# Patient Record
Sex: FEMALE | Race: WHITE | NOT HISPANIC OR LATINO | Employment: FULL TIME | URBAN - METROPOLITAN AREA
[De-identification: names, ages, dates, MRNs, and addresses within clinical notes are randomized per-mention and may not be internally consistent; named-entity substitution may affect disease eponyms.]

---

## 2023-11-10 PROBLEM — R94.131 ABNORMAL ELECTROMYOGRAM (EMG): Status: ACTIVE | Noted: 2023-11-10

## 2023-11-10 PROBLEM — F32.A ANXIETY AND DEPRESSION: Status: ACTIVE | Noted: 2023-11-10

## 2023-11-10 PROBLEM — K25.9 GASTRIC ULCER: Status: ACTIVE | Noted: 2023-11-10

## 2023-11-10 PROBLEM — D36.13 NEUROMA OF LOWER EXTREMITY: Status: ACTIVE | Noted: 2023-11-10

## 2023-11-10 PROBLEM — J45.909 ASTHMA (HHS-HCC): Status: ACTIVE | Noted: 2023-11-10

## 2023-11-10 PROBLEM — K52.832 LYMPHOCYTIC-PLASMACYTIC COLITIS: Status: ACTIVE | Noted: 2023-11-10

## 2023-11-10 PROBLEM — E77.8 HYPOPROTEINEMIA (MULTI): Status: ACTIVE | Noted: 2023-11-10

## 2023-11-10 PROBLEM — R79.89 ELEVATED TSH: Status: ACTIVE | Noted: 2023-11-10

## 2023-11-10 PROBLEM — F41.9 ANXIETY AND DEPRESSION: Status: ACTIVE | Noted: 2023-11-10

## 2023-11-10 PROBLEM — H53.8 BLURRED VISION: Status: ACTIVE | Noted: 2023-11-10

## 2023-11-10 PROBLEM — M54.12 CERVICAL RADICULITIS: Status: ACTIVE | Noted: 2023-11-10

## 2023-11-10 PROBLEM — K63.5 COLON POLYPS: Status: ACTIVE | Noted: 2023-11-10

## 2023-11-10 PROBLEM — G47.9 DISTURBANCE OF SLEEP: Status: ACTIVE | Noted: 2023-11-10

## 2023-11-10 PROBLEM — R20.2 PARESTHESIA: Status: ACTIVE | Noted: 2023-11-10

## 2023-11-10 PROBLEM — K90.0 ADULT CELIAC DISEASE (HHS-HCC): Status: ACTIVE | Noted: 2023-11-10

## 2023-11-10 PROBLEM — E78.5 HLD (HYPERLIPIDEMIA): Status: ACTIVE | Noted: 2023-11-10

## 2023-11-10 PROBLEM — E66.9 OBESITY (BMI 30-39.9): Status: ACTIVE | Noted: 2023-11-10

## 2023-11-10 RX ORDER — ALBUTEROL SULFATE 90 UG/1
2 AEROSOL, METERED RESPIRATORY (INHALATION) EVERY 4 HOURS PRN
COMMUNITY

## 2023-11-10 RX ORDER — ONDANSETRON 4 MG/1
4 TABLET, FILM COATED ORAL 3 TIMES DAILY PRN
COMMUNITY

## 2023-11-10 RX ORDER — BUDESONIDE 3 MG/1
9 CAPSULE, COATED PELLETS ORAL DAILY
COMMUNITY

## 2023-11-10 RX ORDER — BUPROPION HYDROCHLORIDE 300 MG/1
300 TABLET ORAL DAILY
COMMUNITY
End: 2023-11-13 | Stop reason: ALTCHOICE

## 2023-11-10 RX ORDER — ESCITALOPRAM OXALATE 10 MG/1
10 TABLET ORAL DAILY
COMMUNITY
Start: 2023-07-07 | End: 2023-11-13 | Stop reason: ALTCHOICE

## 2023-11-10 RX ORDER — LORATADINE 10 MG/1
10 TABLET ORAL DAILY
COMMUNITY
Start: 2023-03-06

## 2023-11-10 RX ORDER — LORATADINE AND PSEUDOEPHEDRINE SULFATE 5; 120 MG/1; MG/1
1 TABLET, EXTENDED RELEASE ORAL EVERY 12 HOURS PRN
COMMUNITY
Start: 2018-03-20

## 2023-11-10 RX ORDER — ROSUVASTATIN CALCIUM 10 MG/1
10 TABLET, COATED ORAL NIGHTLY
COMMUNITY
Start: 2023-09-15 | End: 2023-11-13 | Stop reason: SDUPTHER

## 2023-11-10 RX ORDER — MESALAMINE 0.38 G/1
0.75 CAPSULE, EXTENDED RELEASE ORAL EVERY MORNING
COMMUNITY
Start: 2023-10-23

## 2023-11-10 RX ORDER — KETOCONAZOLE 20 MG/G
1 CREAM TOPICAL 2 TIMES DAILY
COMMUNITY
Start: 2023-10-26

## 2023-11-13 ENCOUNTER — OFFICE VISIT (OUTPATIENT)
Dept: PRIMARY CARE | Facility: CLINIC | Age: 48
End: 2023-11-13
Payer: COMMERCIAL

## 2023-11-13 VITALS
DIASTOLIC BLOOD PRESSURE: 70 MMHG | HEART RATE: 67 BPM | WEIGHT: 194 LBS | HEIGHT: 67 IN | SYSTOLIC BLOOD PRESSURE: 110 MMHG | BODY MASS INDEX: 30.45 KG/M2

## 2023-11-13 DIAGNOSIS — F32.A ANXIETY AND DEPRESSION: ICD-10-CM

## 2023-11-13 DIAGNOSIS — E78.5 HYPERLIPIDEMIA, UNSPECIFIED HYPERLIPIDEMIA TYPE: Primary | ICD-10-CM

## 2023-11-13 DIAGNOSIS — F41.9 ANXIETY AND DEPRESSION: ICD-10-CM

## 2023-11-13 PROCEDURE — 1036F TOBACCO NON-USER: CPT | Performed by: FAMILY MEDICINE

## 2023-11-13 PROCEDURE — 99213 OFFICE O/P EST LOW 20 MIN: CPT | Performed by: FAMILY MEDICINE

## 2023-11-13 RX ORDER — BUPROPION HYDROCHLORIDE 300 MG/1
300 TABLET ORAL DAILY
Status: CANCELLED | OUTPATIENT
Start: 2023-11-13

## 2023-11-13 RX ORDER — ROSUVASTATIN CALCIUM 10 MG/1
10 TABLET, COATED ORAL NIGHTLY
Qty: 90 TABLET | Refills: 2 | Status: SHIPPED | OUTPATIENT
Start: 2023-11-13

## 2023-11-13 RX ORDER — FLUOXETINE HYDROCHLORIDE 40 MG/1
40 CAPSULE ORAL DAILY
Qty: 30 CAPSULE | Refills: 1 | Status: SHIPPED | OUTPATIENT
Start: 2023-11-13 | End: 2023-11-13 | Stop reason: SDUPTHER

## 2023-11-13 RX ORDER — FLUOXETINE 20 MG/1
20 TABLET ORAL DAILY
Status: CANCELLED | OUTPATIENT
Start: 2023-11-13

## 2023-11-13 ASSESSMENT — PATIENT HEALTH QUESTIONNAIRE - PHQ9
1. LITTLE INTEREST OR PLEASURE IN DOING THINGS: NOT AT ALL
2. FEELING DOWN, DEPRESSED OR HOPELESS: NOT AT ALL
SUM OF ALL RESPONSES TO PHQ9 QUESTIONS 1 AND 2: 0

## 2023-11-13 ASSESSMENT — LIFESTYLE VARIABLES
AUDIT-C TOTAL SCORE: 4
SKIP TO QUESTIONS 9-10: 0
HOW OFTEN DO YOU HAVE A DRINK CONTAINING ALCOHOL: 2-3 TIMES A WEEK
HOW OFTEN DO YOU HAVE SIX OR MORE DRINKS ON ONE OCCASION: LESS THAN MONTHLY
HOW MANY STANDARD DRINKS CONTAINING ALCOHOL DO YOU HAVE ON A TYPICAL DAY: 1 OR 2

## 2023-11-13 NOTE — PROGRESS NOTES
"/70   Pulse 67   Ht 1.702 m (5' 7\")   Wt 88 kg (194 lb)   BMI 30.38 kg/m²     Past Medical History:   Diagnosis Date    Celiac disease 03/20/2018    Adult celiac disease    Obesity, unspecified 12/20/2021    Obesity (BMI 30-39.9)    Other chest pain 12/20/2021    Chest discomfort       Patient Active Problem List   Diagnosis    Adult celiac disease    Anxiety and depression    Asthma    Blurred vision    Colon polyps    Elevated TSH    Gastric ulcer    HLD (hyperlipidemia)    Hypoproteinemia (CMS/HCC)    Obesity (BMI 30-39.9)    Lymphocytic-plasmacytic colitis    Abnormal electromyogram (EMG)    Cervical radiculitis    Disturbance of sleep    Neuroma of lower extremity    Paresthesia       Current Outpatient Medications   Medication Sig Dispense Refill    albuterol 90 mcg/actuation inhaler Inhale 2 puffs every 4 hours if needed for wheezing or shortness of breath.      budesonide EC (Entocort EC) 3 mg 24 hr capsule Take 3 capsules (9 mg) by mouth once daily.      FLUoxetine (PROzac) 20 mg tablet Take 1 tablet (20 mg) by mouth once daily.      ketoconazole (NIZOral) 2 % cream Apply 1 Application topically 2 times a day.      loratadine (Claritin) 10 mg tablet Take 1 tablet (10 mg) by mouth once daily.      loratadine-pseudoephedrine (Claritin-D 12 Hour) 5-120 mg 12 hr tablet Take 1 tablet by mouth every 12 hours if needed.      mesalamine ER (Apriso) 0.375 gram 24 hr capsule Take 2 capsules (0.75 g) by mouth once daily in the morning.      ondansetron (Zofran) 4 mg tablet Take 1 tablet (4 mg) by mouth 3 times a day as needed.      rosuvastatin (Crestor) 10 mg tablet Take 1 tablet (10 mg) by mouth once daily at bedtime.      buPROPion XL (Wellbutrin XL) 300 mg 24 hr tablet Take 1 tablet (300 mg) by mouth once daily.      escitalopram (Lexapro) 10 mg tablet Take 1 tablet (10 mg) by mouth once daily.      FLUoxetine (PROzac) 10 mg tablet TAKE 1 TABLET BY MOUTH EVERY DAY (Patient not taking: Reported on " 11/13/2023) 90 tablet 1     No current facility-administered medications for this visit.       ASSESSMENT/PLAN    Patient is here for 1.  Anxiety depression see below not homicidal not suicidal.  Up to be present off the Lexapro.  Taken fluoxetine 20 mg once a day has worked well.  But has noticed heard work cigarettes has come back.  Is unsure how much of that is just stress and anxiety with above versus other.  But has done well with that 2.  Celiac disease on the med.  Is doing well with her bowel disease which is excellent she is doing much better.  3.  Hypercholesterolemia with above refill on medication.  Pros cons possible side effects increasing medication we did discuss.  We will go up on the fluoxetine to 40 mg once a day.  She is can schedule with new primary care doctor.  She has come back from [place] for a wedding up to [facility] had a wonderful time which is excellent.  Pleasant no apparent distress affect appropriate and bright well-kept younger looking than stated age vital signs stable  Neck supple lungs clear to station normal S1-S2 without murmurs or rub  Anxiety depression we will go up on the fluoxetine to 40 mg once a day.  Continue with that if not resolving let me know if needs further refills and or other let me know will be dependent upon what her insurance will let us prescribed.  And proceed from there follow-up new primary care doctor.  Sooner if needed 2.  Hypercholesterolemia with above get lab work next 4 to 6 months.    There are no diagnoses linked to this encounter.

## 2023-11-18 RX ORDER — FLUOXETINE HYDROCHLORIDE 40 MG/1
40 CAPSULE ORAL DAILY
Qty: 30 CAPSULE | Refills: 1 | Status: SHIPPED | OUTPATIENT
Start: 2023-11-18 | End: 2024-01-11

## 2024-01-11 PROBLEM — N80.03 ADENOMYOSIS: Status: ACTIVE | Noted: 2022-03-04

## 2024-05-15 ENCOUNTER — APPOINTMENT (OUTPATIENT)
Dept: PRIMARY CARE | Facility: CLINIC | Age: 49
End: 2024-05-15
Payer: COMMERCIAL

## 2024-06-12 ENCOUNTER — APPOINTMENT (OUTPATIENT)
Dept: PRIMARY CARE | Facility: CLINIC | Age: 49
End: 2024-06-12
Payer: COMMERCIAL

## 2024-06-12 VITALS
DIASTOLIC BLOOD PRESSURE: 72 MMHG | TEMPERATURE: 98 F | HEART RATE: 75 BPM | HEIGHT: 67 IN | BODY MASS INDEX: 31.23 KG/M2 | SYSTOLIC BLOOD PRESSURE: 111 MMHG | WEIGHT: 199 LBS

## 2024-06-12 DIAGNOSIS — F41.9 ANXIETY AND DEPRESSION: Primary | ICD-10-CM

## 2024-06-12 DIAGNOSIS — E78.5 HYPERLIPIDEMIA, UNSPECIFIED HYPERLIPIDEMIA TYPE: ICD-10-CM

## 2024-06-12 DIAGNOSIS — E55.9 VITAMIN D DEFICIENCY: ICD-10-CM

## 2024-06-12 DIAGNOSIS — F32.A ANXIETY AND DEPRESSION: Primary | ICD-10-CM

## 2024-06-12 DIAGNOSIS — E53.8 VITAMIN B12 DEFICIENCY: ICD-10-CM

## 2024-06-12 PROCEDURE — 1036F TOBACCO NON-USER: CPT | Performed by: FAMILY MEDICINE

## 2024-06-12 PROCEDURE — 99214 OFFICE O/P EST MOD 30 MIN: CPT | Performed by: FAMILY MEDICINE

## 2024-06-12 RX ORDER — FLUOXETINE HYDROCHLORIDE 20 MG/1
20 CAPSULE ORAL DAILY
Qty: 90 CAPSULE | Refills: 1 | Status: SHIPPED | OUTPATIENT
Start: 2024-06-12 | End: 2025-06-12

## 2024-06-12 RX ORDER — BUPROPION HYDROCHLORIDE 150 MG/1
150 TABLET ORAL EVERY MORNING
Qty: 90 TABLET | Refills: 1 | Status: SHIPPED | OUTPATIENT
Start: 2024-06-12 | End: 2025-06-12

## 2024-06-12 ASSESSMENT — PATIENT HEALTH QUESTIONNAIRE - PHQ9
1. LITTLE INTEREST OR PLEASURE IN DOING THINGS: NOT AT ALL
SUM OF ALL RESPONSES TO PHQ9 QUESTIONS 1 AND 2: 0
2. FEELING DOWN, DEPRESSED OR HOPELESS: NOT AT ALL

## 2024-06-12 NOTE — PROGRESS NOTES
"    /72   Pulse 75   Temp 36.7 °C (98 °F)   Ht 1.702 m (5' 7\")   Wt 90.3 kg (199 lb)   BMI 31.17 kg/m²     Past Medical History:   Diagnosis Date    Celiac disease ([facility]) 03/20/2018    Adult celiac disease    Obesity, unspecified 12/20/2021    Obesity (BMI 30-39.9)    Other chest pain 12/20/2021    Chest discomfort       Patient Active Problem List   Diagnosis    Adult celiac disease ([facility])    Anxiety and depression    Asthma ([facility])    Blurred vision    Colon polyps    Elevated TSH    Gastric ulcer    HLD (hyperlipidemia)    Hypoproteinemia (Multi)    Obesity (BMI 30-39.9)    Lymphocytic-plasmacytic colitis    Abnormal electromyogram (EMG)    Cervical radiculitis    Disturbance of sleep    Neuroma of lower extremity    Paresthesia    Adenomyosis    Diarrhea    DUB (dysfunctional uterine bleeding)    Fibrocystic breast changes    Non-intractable vomiting with nausea       Current Outpatient Medications   Medication Sig Dispense Refill    albuterol 90 mcg/actuation inhaler Inhale 2 puffs every 4 hours if needed for wheezing or shortness of breath.      budesonide EC (Entocort EC) 3 mg 24 hr capsule Take 3 capsules (9 mg) by mouth once daily.      FLUoxetine (PROzac) 40 mg capsule TAKE 1 CAPSULE BY MOUTH EVERY DAY 90 capsule 0    ketoconazole (NIZOral) 2 % cream Apply 1 Application topically 2 times a day.      loratadine (Claritin) 10 mg tablet Take 1 tablet (10 mg) by mouth once daily.      loratadine-pseudoephedrine (Claritin-D 12 Hour) 5-120 mg 12 hr tablet Take 1 tablet by mouth every 12 hours if needed.      mesalamine ER (Apriso) 0.375 gram 24 hr capsule Take 2 capsules (0.75 g) by mouth once daily in the morning.      ondansetron (Zofran) 4 mg tablet Take 1 tablet (4 mg) by mouth 3 times a day as needed.      rosuvastatin (Crestor) 10 mg tablet Take 1 tablet (10 mg) by mouth once daily at bedtime. 90 tablet 2     No current facility-administered medications for this visit. "       CC/HPI/ASSESSMENT/PLAN    CC refill medicine    HPI patient with history of anxiety and depression currently using Prozac 40 mg daily.  She notes her last visit 6 months ago the Prozac was increased from 20 mg to 40 mg.  Patient notes no significant movement of her anxiety.  She would like to restart Wellbutrin to help her with her smoking.  She denies fever chills chest pain.  Patient with a history of hyperlipidemia currently using Crestor 10 mg for cholesterol.  She will need blood work.  Denies side effect medication.  Colonoscopy last year.  ROS negative except noted above past medical social surgical history reviewed.  Medication list reviewed    Exam calm neuro alert oriented CN II 12 intact psych calm pleasant no psychosis eyes no jaundice Ext no edema skin no rash    A/P 1.  Anxiety and depression mildly uncontrolled by history.  We will decrease Prozac 40 mg daily.  We will start Wellbutrin  mg daily.  Follow-up 6 months.  #2 hyperlipidemia continue Crestor 10 mg.  Blood work is ordered.  Follow-up 6 months    There are no diagnoses linked to this encounter.

## 2024-07-08 DIAGNOSIS — E78.5 HYPERLIPIDEMIA, UNSPECIFIED HYPERLIPIDEMIA TYPE: ICD-10-CM

## 2024-07-08 RX ORDER — ROSUVASTATIN CALCIUM 10 MG/1
10 TABLET, COATED ORAL NIGHTLY
Qty: 90 TABLET | Refills: 2 | Status: SHIPPED | OUTPATIENT
Start: 2024-07-08

## 2024-07-19 ENCOUNTER — HOSPITAL ENCOUNTER (OUTPATIENT)
Dept: RADIOLOGY | Facility: CLINIC | Age: 49
Discharge: HOME | End: 2024-07-19
Payer: COMMERCIAL

## 2024-07-19 ENCOUNTER — OFFICE VISIT (OUTPATIENT)
Dept: PRIMARY CARE | Facility: CLINIC | Age: 49
End: 2024-07-19
Payer: COMMERCIAL

## 2024-07-19 VITALS
SYSTOLIC BLOOD PRESSURE: 118 MMHG | WEIGHT: 204 LBS | OXYGEN SATURATION: 98 % | HEIGHT: 67 IN | BODY MASS INDEX: 32.02 KG/M2 | HEART RATE: 68 BPM | DIASTOLIC BLOOD PRESSURE: 80 MMHG

## 2024-07-19 DIAGNOSIS — M25.571 ACUTE RIGHT ANKLE PAIN: ICD-10-CM

## 2024-07-19 DIAGNOSIS — M79.671 RIGHT FOOT PAIN: ICD-10-CM

## 2024-07-19 DIAGNOSIS — M79.671 RIGHT FOOT PAIN: Primary | ICD-10-CM

## 2024-07-19 DIAGNOSIS — M72.2 PLANTAR FASCIITIS OF RIGHT FOOT: ICD-10-CM

## 2024-07-19 PROCEDURE — 73610 X-RAY EXAM OF ANKLE: CPT | Mod: RT

## 2024-07-19 PROCEDURE — 99213 OFFICE O/P EST LOW 20 MIN: CPT | Performed by: INTERNAL MEDICINE

## 2024-07-19 PROCEDURE — 73630 X-RAY EXAM OF FOOT: CPT | Mod: RT

## 2024-07-19 PROCEDURE — 1036F TOBACCO NON-USER: CPT | Performed by: INTERNAL MEDICINE

## 2024-07-19 PROCEDURE — 3008F BODY MASS INDEX DOCD: CPT | Performed by: INTERNAL MEDICINE

## 2024-07-19 ASSESSMENT — PATIENT HEALTH QUESTIONNAIRE - PHQ9
1. LITTLE INTEREST OR PLEASURE IN DOING THINGS: NOT AT ALL
2. FEELING DOWN, DEPRESSED OR HOPELESS: NOT AT ALL
SUM OF ALL RESPONSES TO PHQ9 QUESTIONS 1 & 2: 0

## 2024-07-19 ASSESSMENT — ENCOUNTER SYMPTOMS
LOSS OF SENSATION IN FEET: 0
OCCASIONAL FEELINGS OF UNSTEADINESS: 0
DEPRESSION: 0

## 2024-07-19 NOTE — PROGRESS NOTES
Patient is seen in office with chief complain of pain in right foot and ankle for more than a week. She denies fall or direct trauma to right foot. She has history of plantar fascitis, however this pain seems different. She has no other joint swelling or pain. She has no fever or chill. She has no respiratory symptom. Review of other systems is negative.    O/E:  General Exam: No acute discomfort  Eyes; No pallor or jaundice  Lungs : Clear  CVS: Normal exam  Legs: Movements of right ankle are painful. Mild ankle swelling is present  Right foot: No swelling or tenderness    Assessment and plan:  Right ankle pain: Xray of ankle ordered for evaluation. Pain medication over counter as needed.  Right foot pain: X-ray of foot ordered  History of plantar fascitis: She is wearing boot ordered bu her foot doctor. Will follow up with podiatrist

## 2024-09-08 ENCOUNTER — APPOINTMENT (OUTPATIENT)
Dept: RADIOLOGY | Facility: HOSPITAL | Age: 49
End: 2024-09-08
Payer: COMMERCIAL

## 2024-09-08 ENCOUNTER — HOSPITAL ENCOUNTER (EMERGENCY)
Facility: HOSPITAL | Age: 49
Discharge: HOME | End: 2024-09-08
Attending: EMERGENCY MEDICINE
Payer: COMMERCIAL

## 2024-09-08 VITALS
BODY MASS INDEX: 30.31 KG/M2 | HEIGHT: 68 IN | WEIGHT: 200 LBS | DIASTOLIC BLOOD PRESSURE: 90 MMHG | HEART RATE: 71 BPM | TEMPERATURE: 97.7 F | SYSTOLIC BLOOD PRESSURE: 139 MMHG | RESPIRATION RATE: 18 BRPM | OXYGEN SATURATION: 100 %

## 2024-09-08 DIAGNOSIS — M79.642 PAIN OF LEFT HAND: Primary | ICD-10-CM

## 2024-09-08 PROCEDURE — 99283 EMERGENCY DEPT VISIT LOW MDM: CPT

## 2024-09-08 PROCEDURE — 73130 X-RAY EXAM OF HAND: CPT | Mod: LT

## 2024-09-08 PROCEDURE — 73130 X-RAY EXAM OF HAND: CPT | Mod: LEFT SIDE | Performed by: RADIOLOGY

## 2024-09-08 PROCEDURE — 29125 APPL SHORT ARM SPLINT STATIC: CPT

## 2024-09-08 ASSESSMENT — LIFESTYLE VARIABLES
HAVE PEOPLE ANNOYED YOU BY CRITICIZING YOUR DRINKING: NO
HAVE YOU EVER FELT YOU SHOULD CUT DOWN ON YOUR DRINKING: NO
TOTAL SCORE: 0
EVER FELT BAD OR GUILTY ABOUT YOUR DRINKING: NO
EVER HAD A DRINK FIRST THING IN THE MORNING TO STEADY YOUR NERVES TO GET RID OF A HANGOVER: NO

## 2024-09-08 ASSESSMENT — COLUMBIA-SUICIDE SEVERITY RATING SCALE - C-SSRS
2. HAVE YOU ACTUALLY HAD ANY THOUGHTS OF KILLING YOURSELF?: NO
1. IN THE PAST MONTH, HAVE YOU WISHED YOU WERE DEAD OR WISHED YOU COULD GO TO SLEEP AND NOT WAKE UP?: NO
6. HAVE YOU EVER DONE ANYTHING, STARTED TO DO ANYTHING, OR PREPARED TO DO ANYTHING TO END YOUR LIFE?: NO

## 2024-09-08 ASSESSMENT — PAIN SCALES - GENERAL
PAINLEVEL_OUTOF10: 7
PAINLEVEL_OUTOF10: 6

## 2024-09-08 ASSESSMENT — PAIN DESCRIPTION - ORIENTATION: ORIENTATION: LEFT

## 2024-09-08 ASSESSMENT — PAIN DESCRIPTION - PAIN TYPE: TYPE: ACUTE PAIN

## 2024-09-08 ASSESSMENT — PAIN - FUNCTIONAL ASSESSMENT
PAIN_FUNCTIONAL_ASSESSMENT: 0-10
PAIN_FUNCTIONAL_ASSESSMENT: 0-10

## 2024-09-08 ASSESSMENT — PAIN DESCRIPTION - LOCATION: LOCATION: HAND
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
## 2024-09-08 NOTE — DISCHARGE INSTRUCTIONS
Take over-the-counter Tylenol and ibuprofen for pain.  Be sure to stop by registration in triage prior to leaving the emergency department to schedule your follow-up appointment with orthopedics.  Return to the emergency department immediately for any new or worsening symptoms such as worsening pain in your hand or if you experience decreased sensation, worsening swelling or discoloration of your fingertips

## 2024-09-09 ENCOUNTER — APPOINTMENT (OUTPATIENT)
Dept: ORTHOPEDIC SURGERY | Facility: CLINIC | Age: 49
End: 2024-09-09
Payer: COMMERCIAL

## 2024-09-09 DIAGNOSIS — M79.642 PAIN OF LEFT HAND: ICD-10-CM

## 2024-09-09 DIAGNOSIS — S62.359A: Primary | ICD-10-CM

## 2024-09-09 PROCEDURE — L3984 UPPER EXT FX ORTHOSIS WRIST: HCPCS | Performed by: EMERGENCY MEDICINE

## 2024-09-09 PROCEDURE — 26600 TREAT METACARPAL FRACTURE: CPT | Performed by: EMERGENCY MEDICINE

## 2024-09-09 PROCEDURE — 99204 OFFICE O/P NEW MOD 45 MIN: CPT | Performed by: EMERGENCY MEDICINE

## 2024-09-09 ASSESSMENT — PAIN SCALES - GENERAL: PAINLEVEL_OUTOF10: 6
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
## 2024-09-16 ENCOUNTER — APPOINTMENT (OUTPATIENT)
Dept: ORTHOPEDIC SURGERY | Facility: CLINIC | Age: 49
End: 2024-09-16
Payer: COMMERCIAL

## 2024-09-16 ENCOUNTER — HOSPITAL ENCOUNTER (OUTPATIENT)
Dept: RADIOLOGY | Facility: CLINIC | Age: 49
Discharge: HOME | End: 2024-09-16
Payer: COMMERCIAL

## 2024-09-16 DIAGNOSIS — S62.649A: Primary | ICD-10-CM

## 2024-09-16 DIAGNOSIS — M79.642 PAIN OF LEFT HAND: ICD-10-CM

## 2024-09-16 PROCEDURE — 73130 X-RAY EXAM OF HAND: CPT | Mod: LT

## 2024-09-16 PROCEDURE — 73130 X-RAY EXAM OF HAND: CPT | Mod: LEFT SIDE | Performed by: RADIOLOGY

## 2024-09-16 PROCEDURE — 99024 POSTOP FOLLOW-UP VISIT: CPT | Performed by: EMERGENCY MEDICINE

## 2024-09-16 ASSESSMENT — PAIN - FUNCTIONAL ASSESSMENT: PAIN_FUNCTIONAL_ASSESSMENT: NO/DENIES PAIN

## 2024-09-16 NOTE — PROGRESS NOTES
FUV left hand     New patient L hand pain    Subjective    Patient ID: Vania Whiting is a 49 y.o. female.    Chief Complaint: Follow-up of the Left Hand     Last Surgery: No surgery found  Last Surgery Date: No surgery found    Vania is a very pleasant right-hand-dominant 49-year-old female coming in with pain to her left hand.  She was with her  at a 30th high school reunion and was drinking.  She does not remember any traumatic events but thinks that she maybe fell because when she woke up the next day she had bruising and pain along her fourth and fifth metacarpals.  She went to the emergency department where there was no clear fracture.  Her ring was removed.  She has no weakness or numbness.  She was placed in an ulnar gutter splint.  No other complaints or today. We discussed potentially getting an MRI but instead we are just going to immobilize her and treated like a fracture.  She was placed in an Exos ulnar gutter splint and is going to follow-up in 1 week for repeat imaging.    Update on 9/16/2024.  Vania is coming back in and has been compliant with her Exos.  She still has a little bit of discomfort but when she is immobilized her symptoms are minimal.  She is here today for repeat x-rays about 1 week after she fell and injured her left hand.        Objective   Right Hand Exam   Right hand exam is normal.      Left Hand Exam     Tenderness   Left hand tenderness location: Now mostly tender over the fracture sites at the base of the fourth and fifth phalanges   Near the MCP joints.     Muscle Strength   The patient has normal left wrist strength.    Other   Erythema: absent  Sensation: normal  Pulse: present    Comments:  Skin intact.  No rash from the Exos.  No rotational or scissoring deformities.  She does have some stiffness after being immobilized and secondary to some swelling but her strength again appears to be intact.            Image Results:  X-rays of the left hand were repeated reviewed and  interpreted by me on 9/16/2024 and do show 2 fractures.  Both are nondisplaced and involving the base of the fourth and fifth phalanges near the MCP joints.    Patient ID: Vania Whiting is a 49 y.o. female.    Procedures    Assessment/Plan   Encounter Diagnoses:  Traumatic closed nondisplaced fracture of proximal phalanx of finger    Pain of left hand    Orders Placed This Encounter    XR hand left 3+ views     No follow-ups on file.    The x-rays today did demonstrate 2 fractures involving her fourth and fifth fingers near the MCP joints at the base of the proximal phalanges.  She has no rotational deformities or scissoring deformities and I do think that she is going to heal very well continuing her Exos ulnar gutter and immobilization.  She is going to follow-up the week of October 20.  I will be out of town so I am going to have her see my partner Dr. Burnett for repeat x-rays, weaning out of her Exos splint, and possibly initiation of occupational therapy.  She knows to call me if there are any issues or questions before that visit.    ** Please excuse any errors in grammar or translation related to this dictation. Voice recognition software was utilized to prepare this document. **       Henrry Sullivan MD  [facility]

## 2024-10-17 ENCOUNTER — TELEPHONE (OUTPATIENT)
Dept: PRIMARY CARE | Facility: CLINIC | Age: 49
End: 2024-10-17

## 2024-10-17 DIAGNOSIS — N76.1 SUBACUTE VAGINITIS: Primary | ICD-10-CM

## 2024-10-17 RX ORDER — FLUCONAZOLE 150 MG/1
150 TABLET ORAL ONCE
Qty: 1 TABLET | Refills: 1 | Status: SHIPPED | OUTPATIENT
Start: 2024-10-17 | End: 2024-10-17

## 2024-10-22 ENCOUNTER — HOSPITAL ENCOUNTER (OUTPATIENT)
Dept: RADIOLOGY | Facility: CLINIC | Age: 49
Discharge: HOME | End: 2024-10-22
Payer: COMMERCIAL

## 2024-10-22 ENCOUNTER — APPOINTMENT (OUTPATIENT)
Dept: ORTHOPEDIC SURGERY | Facility: CLINIC | Age: 49
End: 2024-10-22
Payer: COMMERCIAL

## 2024-10-22 DIAGNOSIS — S62.649A: ICD-10-CM

## 2024-10-22 DIAGNOSIS — S62.649A: Primary | ICD-10-CM

## 2024-10-22 PROCEDURE — 73130 X-RAY EXAM OF HAND: CPT | Mod: LT

## 2024-10-22 PROCEDURE — 73130 X-RAY EXAM OF HAND: CPT | Mod: LEFT SIDE | Performed by: RADIOLOGY

## 2024-10-22 ASSESSMENT — PAIN SCALES - GENERAL: PAINLEVEL_OUTOF10: 0 - NO PAIN

## 2024-10-22 ASSESSMENT — PAIN - FUNCTIONAL ASSESSMENT: PAIN_FUNCTIONAL_ASSESSMENT: 0-10

## 2024-10-22 NOTE — PROGRESS NOTES
Laurie pt L hand fx follow-up    Subjective    Patient ID: Vania Whiting is a 49 y.o. female.    Chief Complaint: Fracture of the Left Hand     Last Surgery: No surgery found  Last Surgery Date: No surgery found    Vania is a very pleasant right-hand-dominant 49-year-old female coming in with pain to her left hand.  She was with her  at a 30th high school reunion and was drinking.  She does not remember any traumatic events but thinks that she maybe fell because when she woke up the next day she had bruising and pain along her fourth and fifth metacarpals.  She went to the emergency department where there was no clear fracture.  Her ring was removed.  She has no weakness or numbness.  She was placed in an ulnar gutter splint.  No other complaints or today. We discussed potentially getting an MRI but instead we are just going to immobilize her and treated like a fracture.  She was placed in an Exos ulnar gutter splint and is going to follow-up in 1 week for repeat imaging.    Update on 9/16/2024.  Vania is coming back in and has been compliant with her Exos.  She still has a little bit of discomfort but when she is immobilized her symptoms are minimal.  She is here today for repeat x-rays about 1 week after she fell and injured her left hand.    10/22/2024: Patient returns for follow-up of fourth and fifth fingers fracture near the MCP joints at the base of the proximal phalanges.  Patient has been adherent to Exos splint.  Given doing well, she has slightly weaned out of the splint when typing.  Denies injury, trauma, fall.  Denies needing pain medications.    Objective   Right Hand Exam   Right hand exam is normal.      Left Hand Exam     Tenderness   Left hand tenderness location: Now mostly tender over the fracture sites at the base of the fourth and fifth phalanges   Near the MCP joints.     Muscle Strength   The patient has normal left wrist strength.    Other   Erythema: absent  Sensation: normal  Pulse:  present    Comments:  Skin intact.  No rash from the Exos.  No rotational or scissoring deformities.  She has minimal stiffness which was to be expected given wearing Exos splint.          Image Results:  X-rays of the left hand were repeated reviewed and interpreted by me on 10/22/2024.  She has nondisplaced base of the fourth and fifth phalanx near MCP on radial side that demonstrates callus and bridging formation    Assessment/Plan   Encounter Diagnoses:  Traumatic closed nondisplaced fracture of proximal phalanx of finger    Orders Placed This Encounter    XR hand left 3+ views     No follow-ups on file.  The x-rays today demonstrate healing fourth and fifth phalanx near MCP on radial side fractures.  Physical exam was only remarkable with minimal stiffness when trying to make a fist.  Given stable and healing fracture with no pain and minimal stiffness likely due to Exos splint, given nondominant hand,  - Discontinue Exos splint  - Encouraged range of motion and return to normal activities    Follow-up as needed.    ** Please excuse any errors in grammar or translation related to this dictation. Voice recognition software was utilized to prepare this document. **     Jairo Reene MD  Primary Care Sports Medicine Fellow  [facility]  [facility]

## 2024-10-30 ENCOUNTER — OFFICE VISIT (OUTPATIENT)
Dept: PRIMARY CARE | Facility: CLINIC | Age: 49
End: 2024-10-30
Payer: COMMERCIAL

## 2024-10-30 VITALS
HEART RATE: 66 BPM | HEIGHT: 68 IN | TEMPERATURE: 98 F | BODY MASS INDEX: 32.58 KG/M2 | SYSTOLIC BLOOD PRESSURE: 108 MMHG | WEIGHT: 215 LBS | DIASTOLIC BLOOD PRESSURE: 80 MMHG

## 2024-10-30 DIAGNOSIS — J01.00 ACUTE NON-RECURRENT MAXILLARY SINUSITIS: Primary | ICD-10-CM

## 2024-10-30 DIAGNOSIS — J45.20 MILD INTERMITTENT ASTHMA, UNSPECIFIED WHETHER COMPLICATED (HHS-HCC): ICD-10-CM

## 2024-10-30 DIAGNOSIS — J40 BRONCHITIS: ICD-10-CM

## 2024-10-30 PROBLEM — N39.41 URGE INCONTINENCE: Status: ACTIVE | Noted: 2024-08-27

## 2024-10-30 PROBLEM — R06.02 SHORTNESS OF BREATH: Status: ACTIVE | Noted: 2024-10-30

## 2024-10-30 PROBLEM — L98.9 SKIN LESION: Status: ACTIVE | Noted: 2024-10-30

## 2024-10-30 PROBLEM — N39.3 STRESS INCONTINENCE IN FEMALE: Status: ACTIVE | Noted: 2024-08-27

## 2024-10-30 PROBLEM — J04.0 LARYNGITIS: Status: ACTIVE | Noted: 2024-10-30

## 2024-10-30 PROBLEM — U07.1 DISEASE DUE TO SEVERE ACUTE RESPIRATORY SYNDROME CORONAVIRUS 2 (SARS-COV-2): Status: ACTIVE | Noted: 2024-10-30

## 2024-10-30 PROBLEM — D36.13: Status: ACTIVE | Noted: 2024-10-30

## 2024-10-30 PROCEDURE — 3008F BODY MASS INDEX DOCD: CPT | Performed by: FAMILY MEDICINE

## 2024-10-30 PROCEDURE — 99213 OFFICE O/P EST LOW 20 MIN: CPT | Performed by: FAMILY MEDICINE

## 2024-10-30 RX ORDER — CEFUROXIME AXETIL 250 MG/1
250 TABLET ORAL 2 TIMES DAILY
Qty: 20 TABLET | Refills: 0 | Status: SHIPPED | OUTPATIENT
Start: 2024-10-30 | End: 2024-11-09

## 2024-10-30 RX ORDER — METHYLPREDNISOLONE 4 MG/1
TABLET ORAL
Qty: 21 TABLET | Refills: 0 | Status: SHIPPED | OUTPATIENT
Start: 2024-10-30 | End: 2024-11-06

## 2024-10-30 RX ORDER — ALBUTEROL SULFATE 90 UG/1
2 INHALANT RESPIRATORY (INHALATION) EVERY 4 HOURS PRN
Qty: 18 G | Refills: 3 | Status: SHIPPED | OUTPATIENT
Start: 2024-10-30

## 2024-10-30 ASSESSMENT — PATIENT HEALTH QUESTIONNAIRE - PHQ9
SUM OF ALL RESPONSES TO PHQ9 QUESTIONS 1 AND 2: 0
2. FEELING DOWN, DEPRESSED OR HOPELESS: NOT AT ALL
1. LITTLE INTEREST OR PLEASURE IN DOING THINGS: NOT AT ALL

## 2024-11-01 DIAGNOSIS — F41.9 ANXIETY AND DEPRESSION: ICD-10-CM

## 2024-11-01 DIAGNOSIS — F32.A ANXIETY AND DEPRESSION: ICD-10-CM

## 2024-11-04 RX ORDER — FLUOXETINE HYDROCHLORIDE 20 MG/1
20 CAPSULE ORAL DAILY
Qty: 90 CAPSULE | Refills: 1 | Status: SHIPPED | OUTPATIENT
Start: 2024-11-04

## 2024-11-04 RX ORDER — BUPROPION HYDROCHLORIDE 150 MG/1
150 TABLET ORAL EVERY MORNING
Qty: 90 TABLET | Refills: 1 | Status: SHIPPED | OUTPATIENT
Start: 2024-11-04 | End: 2025-11-04

## 2024-12-16 ENCOUNTER — APPOINTMENT (OUTPATIENT)
Dept: PRIMARY CARE | Facility: CLINIC | Age: 49
End: 2024-12-16
Payer: COMMERCIAL

## 2024-12-30 ENCOUNTER — APPOINTMENT (OUTPATIENT)
Dept: PRIMARY CARE | Facility: CLINIC | Age: 49
End: 2024-12-30
Payer: COMMERCIAL

## 2025-01-24 ENCOUNTER — APPOINTMENT (OUTPATIENT)
Dept: PRIMARY CARE | Facility: CLINIC | Age: 50
End: 2025-01-24
Payer: COMMERCIAL

## 2025-01-24 VITALS
BODY MASS INDEX: 32.13 KG/M2 | WEIGHT: 212 LBS | DIASTOLIC BLOOD PRESSURE: 70 MMHG | SYSTOLIC BLOOD PRESSURE: 118 MMHG | HEIGHT: 68 IN | HEART RATE: 92 BPM | TEMPERATURE: 98 F

## 2025-01-24 DIAGNOSIS — F41.9 ANXIETY AND DEPRESSION: ICD-10-CM

## 2025-01-24 DIAGNOSIS — R21 RASH: ICD-10-CM

## 2025-01-24 DIAGNOSIS — F32.A ANXIETY AND DEPRESSION: ICD-10-CM

## 2025-01-24 DIAGNOSIS — G25.81 RLS (RESTLESS LEGS SYNDROME): Primary | ICD-10-CM

## 2025-01-24 DIAGNOSIS — R11.0 NAUSEA: ICD-10-CM

## 2025-01-24 DIAGNOSIS — E78.5 HYPERLIPIDEMIA, UNSPECIFIED HYPERLIPIDEMIA TYPE: ICD-10-CM

## 2025-01-24 PROBLEM — J04.0 LARYNGITIS: Status: RESOLVED | Noted: 2024-10-30 | Resolved: 2025-01-24

## 2025-01-24 PROBLEM — J01.00 ACUTE NON-RECURRENT MAXILLARY SINUSITIS: Status: RESOLVED | Noted: 2024-10-30 | Resolved: 2025-01-24

## 2025-01-24 PROCEDURE — 99214 OFFICE O/P EST MOD 30 MIN: CPT | Performed by: FAMILY MEDICINE

## 2025-01-24 PROCEDURE — 3008F BODY MASS INDEX DOCD: CPT | Performed by: FAMILY MEDICINE

## 2025-01-24 RX ORDER — METRONIDAZOLE 7.5 MG/G
GEL TOPICAL 2 TIMES DAILY
Qty: 45 G | Refills: 2 | Status: SHIPPED | OUTPATIENT
Start: 2025-01-24 | End: 2026-01-24

## 2025-01-24 RX ORDER — PRAMIPEXOLE DIHYDROCHLORIDE 0.12 MG/1
0.12 TABLET ORAL 2 TIMES DAILY
Qty: 180 TABLET | Refills: 1 | Status: SHIPPED | OUTPATIENT
Start: 2025-01-24 | End: 2026-01-24

## 2025-01-24 RX ORDER — ONDANSETRON 4 MG/1
4 TABLET, ORALLY DISINTEGRATING ORAL DAILY PRN
Qty: 90 TABLET | Refills: 1 | Status: SHIPPED | OUTPATIENT
Start: 2025-01-24 | End: 2026-01-24

## 2025-01-24 NOTE — PROGRESS NOTES
"    /70   Pulse 92   Temp 36.7 °C (98 °F)   Ht 1.727 m (5' 8\")   Wt 96.2 kg (212 lb)   BMI 32.23 kg/m²     Past Medical History:   Diagnosis Date    Celiac disease ([facility]) 03/20/2018    Adult celiac disease    Obesity, unspecified 12/20/2021    Obesity (BMI 30-39.9)    Other chest pain 12/20/2021    Chest discomfort       Patient Active Problem List   Diagnosis    Adult celiac disease ([facility])    Anxiety and depression    Asthma    Blurred vision    Colon polyps    Elevated TSH    Gastric ulcer    HLD (hyperlipidemia)    Hypoproteinemia (Multi)    Obesity (BMI 30-39.9)    Lymphocytic-plasmacytic colitis    Abnormal electromyogram (EMG)    Cervical radiculitis    Disturbance of sleep    Neuroma of lower extremity    Paresthesia    Adenomyosis    Diarrhea    DUB (dysfunctional uterine bleeding)    Fibrocystic breast changes    Non-intractable vomiting with nausea    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 18 g 3    budesonide EC (Entocort EC) 3 mg 24 hr capsule Take 3 capsules (9 mg) by mouth once daily.      buPROPion XL (Wellbutrin XL) 150 mg 24 hr tablet TAKE 1 TABLET (150 MG) BY MOUTH ONCE DAILY IN THE MORNING. DO NOT CRUSH, CHEW, OR SPLIT. 90 tablet 1    FLUoxetine (PROzac) 20 mg capsule TAKE 1 CAPSULE BY MOUTH EVERY DAY 90 capsule 1    loratadine (Claritin) 10 mg tablet Take 1 tablet (10 mg) by mouth once daily.      loratadine-pseudoephedrine (Claritin-D 12 Hour) 5-120 mg 12 hr tablet Take 1 tablet by mouth every 12 hours if needed.      mesalamine ER (Apriso) " 0.375 gram 24 hr capsule Take 2 capsules (0.75 g) by mouth once daily in the morning.      ondansetron (Zofran) 4 mg tablet Take 1 tablet (4 mg) by mouth 3 times a day as needed.      rosuvastatin (Crestor) 10 mg tablet TAKE 1 TABLET (10 MG) BY MOUTH ONCE DAILY AT BEDTIME. 90 tablet 2     No current facility-administered medications for this visit.       CC/HPI/ASSESSMENT/PLAN    CC follow medication    HPI patient with a history of anxiety and depression hyperlipidemia, requesting refills for medication.  She will need blood work ordered.  Patient intermittently experiences nausea and uses Zofran with good results.  Patient notes she is developed a rash on her face that appears consistent with rosacea.  We will try MetroGel cream and she will see dermatology.  Patient notes she is also experiencing restless leg syndrome, she notes she has difficult time sleeping and commonly uses Tylenol PM.  We discussed using restless leg syndrome medications.  ROS negative except noted above past medical social history reviewed    Exam calm vital stable neck supple lungs CTA CV RRR no G facial exam reveals mild erythematous rash on the face psych calm pleasant female no psychosis    A/P 1.  Restless leg syndrome new diagnosis.  Pramipexole 0.125 mg, take 1-2 at bedtime ordered.  #2 anxiety and depression chronic stable meds refilled 3 rash most likely rosacea.  Patient will see dermatology.  MetroGel as ordered.  4 hyperlipidemia.  Medicine refilled blood work ordered 5 nausea Zofran refilled.  Follow-up 6 months    There are no diagnoses linked to this encounter.

## 2025-02-02 DIAGNOSIS — E78.5 HYPERLIPIDEMIA, UNSPECIFIED HYPERLIPIDEMIA TYPE: ICD-10-CM

## 2025-02-03 RX ORDER — ROSUVASTATIN CALCIUM 10 MG/1
10 TABLET, COATED ORAL NIGHTLY
Qty: 90 TABLET | Refills: 2 | Status: SHIPPED | OUTPATIENT
Start: 2025-02-03

## 2025-06-12 ENCOUNTER — TELEPHONE (OUTPATIENT)
Dept: PRIMARY CARE | Facility: CLINIC | Age: 50
End: 2025-06-12
Payer: COMMERCIAL

## 2025-06-12 DIAGNOSIS — E55.9 VITAMIN D DEFICIENCY: ICD-10-CM

## 2025-06-12 DIAGNOSIS — E78.5 HYPERLIPIDEMIA, UNSPECIFIED HYPERLIPIDEMIA TYPE: ICD-10-CM

## 2025-06-12 DIAGNOSIS — E53.8 VITAMIN B12 DEFICIENCY: Primary | ICD-10-CM

## 2025-06-12 DIAGNOSIS — R79.89 ELEVATED TSH: ICD-10-CM

## 2025-08-20 LAB
25(OH)D3+25(OH)D2 SERPL-MCNC: 29 NG/ML (ref 30–100)
ALBUMIN SERPL-MCNC: 4 G/DL (ref 3.6–5.1)
ALP SERPL-CCNC: 53 U/L (ref 37–153)
ALT SERPL-CCNC: 19 U/L (ref 6–29)
ANION GAP SERPL CALCULATED.4IONS-SCNC: 5 MMOL/L (CALC) (ref 7–17)
AST SERPL-CCNC: 16 U/L (ref 10–35)
BILIRUB SERPL-MCNC: 0.4 MG/DL (ref 0.2–1.2)
BUN SERPL-MCNC: 11 MG/DL (ref 7–25)
CALCIUM SERPL-MCNC: 8.6 MG/DL (ref 8.6–10.4)
CHLORIDE SERPL-SCNC: 107 MMOL/L (ref 98–110)
CHOLEST SERPL-MCNC: 172 MG/DL
CHOLEST/HDLC SERPL: 2.3 (CALC)
CO2 SERPL-SCNC: 29 MMOL/L (ref 20–32)
CREAT SERPL-MCNC: 0.78 MG/DL (ref 0.5–1.03)
EGFRCR SERPLBLD CKD-EPI 2021: 92 ML/MIN/1.73M2
EST. AVERAGE GLUCOSE BLD GHB EST-MCNC: 105 MG/DL
EST. AVERAGE GLUCOSE BLD GHB EST-SCNC: 5.8 MMOL/L
GLUCOSE SERPL-MCNC: 95 MG/DL (ref 65–99)
HBA1C MFR BLD: 5.3 %
HDLC SERPL-MCNC: 75 MG/DL
LDLC SERPL CALC-MCNC: 77 MG/DL (CALC)
NONHDLC SERPL-MCNC: 97 MG/DL (CALC)
POTASSIUM SERPL-SCNC: 4.1 MMOL/L (ref 3.5–5.3)
PROT SERPL-MCNC: 6.3 G/DL (ref 6.1–8.1)
SODIUM SERPL-SCNC: 141 MMOL/L (ref 135–146)
TRIGL SERPL-MCNC: 112 MG/DL
TSH SERPL-ACNC: 2.37 MIU/L
VIT B12 SERPL-MCNC: 403 PG/ML (ref 200–1100)

## 2025-08-20 ASSESSMENT — PROMIS GLOBAL HEALTH SCALE
CARRYOUT_SOCIAL_ACTIVITIES: VERY GOOD
RATE_SOCIAL_SATISFACTION: GOOD
CARRYOUT_PHYSICAL_ACTIVITIES: MOSTLY
RATE_AVERAGE_FATIGUE: MODERATE
EMOTIONAL_PROBLEMS: SOMETIMES
RATE_AVERAGE_PAIN: 2
RATE_MENTAL_HEALTH: GOOD
RATE_PHYSICAL_HEALTH: FAIR
RATE_GENERAL_HEALTH: GOOD
RATE_QUALITY_OF_LIFE: VERY GOOD

## 2025-08-22 ENCOUNTER — APPOINTMENT (OUTPATIENT)
Dept: PRIMARY CARE | Facility: CLINIC | Age: 50
End: 2025-08-22
Payer: COMMERCIAL

## 2025-08-22 VITALS
BODY MASS INDEX: 32.43 KG/M2 | TEMPERATURE: 98 F | HEART RATE: 67 BPM | SYSTOLIC BLOOD PRESSURE: 112 MMHG | WEIGHT: 214 LBS | HEIGHT: 68 IN | DIASTOLIC BLOOD PRESSURE: 72 MMHG

## 2025-08-22 DIAGNOSIS — E53.8 VITAMIN B12 DEFICIENCY: Primary | ICD-10-CM

## 2025-08-22 DIAGNOSIS — E78.5 HYPERLIPIDEMIA, UNSPECIFIED HYPERLIPIDEMIA TYPE: ICD-10-CM

## 2025-08-22 DIAGNOSIS — F41.9 ANXIETY AND DEPRESSION: ICD-10-CM

## 2025-08-22 DIAGNOSIS — F32.A ANXIETY AND DEPRESSION: ICD-10-CM

## 2025-08-22 DIAGNOSIS — Z13.1 SCREENING FOR DIABETES MELLITUS: ICD-10-CM

## 2025-08-22 DIAGNOSIS — Z13.29 SCREENING FOR THYROID DISORDER: ICD-10-CM

## 2025-08-22 DIAGNOSIS — G25.81 RLS (RESTLESS LEGS SYNDROME): ICD-10-CM

## 2025-08-22 DIAGNOSIS — R11.0 NAUSEA: ICD-10-CM

## 2025-08-22 DIAGNOSIS — E55.9 VITAMIN D DEFICIENCY: ICD-10-CM

## 2025-08-22 PROCEDURE — 3008F BODY MASS INDEX DOCD: CPT | Performed by: FAMILY MEDICINE

## 2025-08-22 PROCEDURE — 99396 PREV VISIT EST AGE 40-64: CPT | Performed by: FAMILY MEDICINE

## 2025-08-22 RX ORDER — PRAMIPEXOLE DIHYDROCHLORIDE 0.12 MG/1
0.12 TABLET ORAL 2 TIMES DAILY
Qty: 180 TABLET | Refills: 1 | Status: SHIPPED | OUTPATIENT
Start: 2025-08-22

## 2025-08-22 RX ORDER — ROSUVASTATIN CALCIUM 10 MG/1
10 TABLET, COATED ORAL NIGHTLY
Qty: 90 TABLET | Refills: 1 | Status: SHIPPED | OUTPATIENT
Start: 2025-08-22

## 2025-08-22 RX ORDER — BUPROPION HYDROCHLORIDE 150 MG/1
150 TABLET ORAL EVERY MORNING
Qty: 90 TABLET | Refills: 1 | Status: SHIPPED | OUTPATIENT
Start: 2025-08-22 | End: 2026-02-18

## 2025-08-22 RX ORDER — FLUOXETINE 20 MG/1
20 CAPSULE ORAL DAILY
Qty: 90 CAPSULE | Refills: 1 | Status: SHIPPED | OUTPATIENT
Start: 2025-08-22

## 2025-08-22 RX ORDER — ONDANSETRON 4 MG/1
4 TABLET, ORALLY DISINTEGRATING ORAL DAILY PRN
Qty: 90 TABLET | Refills: 1 | Status: SHIPPED | OUTPATIENT
Start: 2025-08-22 | End: 2026-08-22

## 2025-08-22 ASSESSMENT — PAIN SCALES - GENERAL: PAINLEVEL_OUTOF10: 0-NO PAIN

## 2025-08-22 ASSESSMENT — ACTIVITIES OF DAILY LIVING (ADL)
TAKING_MEDICATION: INDEPENDENT
GROCERY_SHOPPING: INDEPENDENT
BATHING: INDEPENDENT
DOING_HOUSEWORK: INDEPENDENT
MANAGING_FINANCES: INDEPENDENT
DRESSING: INDEPENDENT
